# Patient Record
Sex: FEMALE | Race: AMERICAN INDIAN OR ALASKA NATIVE | NOT HISPANIC OR LATINO
[De-identification: names, ages, dates, MRNs, and addresses within clinical notes are randomized per-mention and may not be internally consistent; named-entity substitution may affect disease eponyms.]

---

## 2021-01-25 ENCOUNTER — TRANSCRIPTION ENCOUNTER (OUTPATIENT)
Age: 61
End: 2021-01-25

## 2022-08-17 PROBLEM — Z00.00 ENCOUNTER FOR PREVENTIVE HEALTH EXAMINATION: Status: ACTIVE | Noted: 2022-08-17

## 2022-08-25 ENCOUNTER — NON-APPOINTMENT (OUTPATIENT)
Age: 62
End: 2022-08-25

## 2022-08-29 ENCOUNTER — NON-APPOINTMENT (OUTPATIENT)
Age: 62
End: 2022-08-29

## 2022-08-29 ENCOUNTER — APPOINTMENT (OUTPATIENT)
Dept: NEUROSURGERY | Facility: CLINIC | Age: 62
End: 2022-08-29

## 2022-08-29 VITALS
SYSTOLIC BLOOD PRESSURE: 116 MMHG | BODY MASS INDEX: 14.11 KG/M2 | DIASTOLIC BLOOD PRESSURE: 78 MMHG | OXYGEN SATURATION: 97 % | HEIGHT: 59 IN | TEMPERATURE: 98 F | HEART RATE: 81 BPM | WEIGHT: 70 LBS

## 2022-08-29 PROCEDURE — 99205 OFFICE O/P NEW HI 60 MIN: CPT

## 2022-08-29 NOTE — END OF VISIT
[FreeTextEntry3] : I have seen the patient and reviewed the case together with PA and I agree with the final recommendations and plan of care.\par \par Phillip Strong MD\par Neurosurgery\par \par  [Time Spent: ___ minutes] : I have spent [unfilled] minutes of time on the encounter. [>50% of the face to face encounter time was spent on counseling and/or coordination of care for ___] : Greater than 50% of the face to face encounter time was spent on counseling and/or coordination of care for [unfilled]

## 2022-08-29 NOTE — HISTORY OF PRESENT ILLNESS
[de-identified] : JOSE CEBALLOS is a 61 year old right handed female with a PMH of hypothyroidism, left side cervical myelitis C3 age 21 with left side weakness at baseline, C4-7 ACDF for cervical myelopathy at [facility] by Dr. Fuentes complicated by B/L PE November 2019 who presents to the office today self-referred for neurosurgical consultation due to neck and low back pain. She had significant improvement in neck pain and ambulation after Nov 2019 C4-7 ACDF. However, in March 2020 she reached into a deep washing machine and severe pain returned which has remained since then. She has chronic neck and low back pain. The pain is characterized as burning in nature.  It started over 2 years ago ago. It is exacerbated by sitting, standing, walking long distances  and relieved by rest.  It radiates right arm and right leg down to the foot. She has Right foot numbness. There has been no known trauma precipitating the pain. The patient denies new numbness of extremities, new weakness of extremities, bowel or bladder incontinence and new gait disturbance. She has tried JUAN, PT, and pain medications including Percocet, gabapetin, tylenol, advil without relief.  She has undergone imaging in the form of MRI lumbar spine at [facility] on 6/6/22 which revealed mild to moderate disc buldges throughout the lumbar spine without focal disc herniation (see detailed report scanned in EMR), and brought in a CD for review. Most recent cervical spine imaging done March 2020 which she does not have with her. \par \par PMH: per HPI\par PSH:C4-7 ACDF Nov 2019\par FamHx:mother seizures\par Social Hx: not currently working,  lives with  and daughter is her caregiver, non -smoker, denies Etoh\par Allergies: NKDA\par Medications: Percocet QHS, levothyroxine 75mcg\par  \par \par Pain management: Dr. Carr\par Neurologist: Dr. Barakat\par PCP: Dr. Robin Michaud

## 2022-08-29 NOTE — PHYSICAL EXAM
[General Appearance - Alert] : alert [General Appearance - In No Acute Distress] : in no acute distress [Oriented To Time, Place, And Person] : oriented to person, place, and time [Impaired Insight] : insight and judgment were intact [Affect] : the affect was normal [Person] : oriented to person [Place] : oriented to place [Time] : oriented to time [Short Term Intact] : short term memory intact [Remote Intact] : remote memory intact [Span Intact] : the attention span was normal [Concentration Intact] : normal concentrating ability [Fluency] : fluency intact [Comprehension] : comprehension intact [Current Events] : adequate knowledge of current events [Past History] : adequate knowledge of personal past history [Vocabulary] : adequate range of vocabulary [Cranial Nerves Optic (II)] : visual acuity intact bilaterally,  pupils equal round and reactive to light [Cranial Nerves Oculomotor (III)] : extraocular motion intact [Cranial Nerves Trigeminal (V)] : facial sensation intact symmetrically [Cranial Nerves Facial (VII)] : face symmetrical [Cranial Nerves Vestibulocochlear (VIII)] : hearing was intact bilaterally [Cranial Nerves Glossopharyngeal (IX)] : tongue and palate midline [Cranial Nerves Accessory (XI - Cranial And Spinal)] : head turning and shoulder shrug symmetric [Cranial Nerves Hypoglossal (XII)] : there was no tongue deviation with protrusion [Sensation Tactile Decrease] : light touch was intact [2+] : Triceps left 2+ [Hemiplegia Of Left Side] : hemiplegia is present on the left [3+] : Patella left 3+ [Past-pointing] : there was no past-pointing [Tremor] : no tremor present [FreeTextEntry6] : right 5/5 throughout [FreeTextEntry8] : unsteady gait with cane

## 2022-08-29 NOTE — ASSESSMENT
[FreeTextEntry1] : I have discussed the natural history and treatment options for chronic lumbar and cervical radiculopathy with the patient. I explained the indications for observation, conservative management, medical management, physical therapy, pain management approaches and surgery. I explained the different types and surgical approaches including decompression only procedures and instrumented fusions. I discussed the risks, benefits, possible complications and expected outcome related to each treatment option. The risks of surgery were discussed in detail including but not limited to postoperative infection at the surgical site, hospital acquired pneumonia, hospital acquired urinary tract infection, postoperative meningitis, wound dehiscence, CSF leak, stroke (ischemic and hemorrhagic), postoperative seizures, worsening motor function due to spinal cord or nerve injury, postoperative visual deficit which could be permanent (blindness) when surgery is performed in a prone position, cardiovascular complications (MI, PE, DVT) and I also explained that some of these complications could lead to sepsis, coma or even death. I discussed the fact that some of these complications may require subsequent surgical procedure(s) to correct them. \par I have reviewed her recent lumbar spine MRI which does not reveal significant disc disease or spondylosis. For her symptoms of cervical radiculopathy, I recommend MRI of cervical spine without contrast to evaluate for cervical spinal stenosis as well has CT cervical spine without contrast and X-rays of cervical spine with flexion and extension to evaluate state of her surgical hardware. She should follow up with us in 2-4 weeks when imaging is complete. The patient understands the plan of care and is in agreement.  All questions answered to patient satisfaction.

## 2022-10-03 ENCOUNTER — RESULT REVIEW (OUTPATIENT)
Age: 62
End: 2022-10-03

## 2022-10-12 ENCOUNTER — APPOINTMENT (OUTPATIENT)
Dept: NEUROSURGERY | Facility: CLINIC | Age: 62
End: 2022-10-12

## 2022-10-12 VITALS
HEART RATE: 107 BPM | HEIGHT: 59 IN | TEMPERATURE: 98.7 F | SYSTOLIC BLOOD PRESSURE: 111 MMHG | DIASTOLIC BLOOD PRESSURE: 39 MMHG | WEIGHT: 70 LBS | OXYGEN SATURATION: 98 % | BODY MASS INDEX: 14.11 KG/M2

## 2022-10-12 PROCEDURE — 99215 OFFICE O/P EST HI 40 MIN: CPT

## 2022-10-13 ENCOUNTER — TRANSCRIPTION ENCOUNTER (OUTPATIENT)
Age: 62
End: 2022-10-13

## 2022-10-13 NOTE — ASSESSMENT
[FreeTextEntry1] : I have discussed the natural history and treatment options for chronic lumbar and cervical radiculopathy with the patient. I explained the indications for observation, conservative management, medical management, physical therapy, pain management approaches and surgery. I explained the different types and surgical approaches including decompression only procedures and instrumented fusions. I discussed the risks, benefits, possible complications and expected outcome related to each treatment option. The risks of surgery were discussed in detail including but not limited to postoperative infection at the surgical site, hospital acquired pneumonia, hospital acquired urinary tract infection, postoperative meningitis, wound dehiscence, CSF leak, stroke (ischemic and hemorrhagic), postoperative seizures, worsening motor function due to spinal cord or nerve injury, postoperative visual deficit which could be permanent (blindness) when surgery is performed in a prone position, cardiovascular complications (MI, PE, DVT) and I also explained that some of these complications could lead to sepsis, coma or even death. I discussed the fact that some of these complications may require subsequent surgical procedure(s) to correct them. \par In the end, I recommend conservative management with physical therapy and pain management. Referrals were provided to the patient. The patient understands the plan of care and is in agreement. All questions answered to patient satisfaction.

## 2022-10-13 NOTE — HISTORY OF PRESENT ILLNESS
[FreeTextEntry1] : Ms. Pacheco returns today for interval follow up and imaging review. She has undergone MRI, CT and flexex X-ray of cervical spine as requested, see detailed reports below. No interval changes to presenting symptoms or medical history\par \par 8/29/22: JOSE PACHECO is a 61 year old right handed female with a PMH of hypothyroidism, left side cervical myelitis C3 age 21 with left side weakness at baseline, C4-7 ACDF for cervical myelopathy at [facility] by Dr. Fuentes complicated by B/L PE November 2019 who presents to the office today self-referred for neurosurgical consultation due to neck and low back pain. She had significant improvement in neck pain and ambulation after Nov 2019 C4-7 ACDF. However, in March 2020 she reached into a deep washing machine and severe pain returned which has remained since then. She has chronic neck and low back pain. The pain is characterized as burning in nature. It started over 2 years ago ago. It is exacerbated by sitting, standing, walking long distances and relieved by rest. It radiates right arm and right leg down to the foot. She has Right foot numbness. There has been no known trauma precipitating the pain. The patient denies new numbness of extremities, new weakness of extremities, bowel or bladder incontinence and new gait disturbance. She has tried JUAN, PT, and pain medications including Percocet, gabapetin, tylenol, advil without relief. She has undergone imaging in the form of MRI lumbar spine at [facility] on 6/6/22 which revealed mild to moderate disc buldges throughout the lumbar spine without focal disc herniation (see detailed report scanned in EMR), and brought in a CD for review. Most recent cervical spine imaging done March 2020 which she does not have with her. \par \par PMH: per HPI\par PSH:C4-7 ACDF Nov 2019\par FamHx:mother seizures\par Social Hx: not currently working,  lives with  and daughter is her caregiver, non -smoker, denies Etoh\par Allergies: NKDA\par Medications: Percocet QHS, levothyroxine 75mcg\par  \par \par Pain management: Dr. Carr\par Neurologist: Dr. Barakat\par PCP: Dr. Robin Mihcaud \par

## 2022-10-13 NOTE — DATA REVIEWED
[de-identified] : Exam Date: 	  10/03/22					\par Exam: 	CT CERVICAL SPINE					\par Order#:	[id number]					\par             \par \par \par Chronic cervical radiculopathy.\par \par \par TECHNIQUE: CT of the cervical spine was performed utilizing helically obtained axial images from the occiput through T1. Images were reformatted into coronal and sagittal orientation.\par \par Findings: The patient is status post anterior cervical discectomy and fusion of C4, C5, C6 and C7. There is a ventral plate and anterior screws present. There has been interbody fusion at the C4/C5 C5/C6 and C6/C7 disc spaces.\par \par There is mild reversal of the cervical spine. There is minimal to mild retrolisthesis of C3 on C4 (approximately 2-3 mm). There is minimal anterolisthesis of C7 on T1 (approximately 1 to 2 mm). The vertebral bodies are of normal height. The remaining intervertebral disc spaces demonstrate mild loss of height consistent with desiccation. There is no evidence of prevertebral soft tissue swelling.\par \par Evaluation of the individual levels demonstrates no evidence of focal disc herniation or spinal cord compression.\par \par At the C2/3 level, there is tiny central disc herniation indenting the thecal sac. The bilateral neuroforamen are patent. There is no evidence of spinal cord compression.\par \par At the C3/4 level, there is a disc osteophyte complex mildly flattening the ventral spinal cord. There is mild uncovertebral and facet hypertrophy. The bilateral neuroforamen are patent.\par \par At the C4/5 level, the patient is status post fusion. There is a disc osteophyte complex that appears to be flattening the ventral spinal cord. There is bilateral uncovertebral and facet hypertrophy. There is moderate to severe right and moderate left foraminal narrowing.\par \par At the C5/6 level, the patient is status post fusion. There is a disc osteophyte complex flattening the ventral spinal cord. There is bilateral uncovertebral and facet hypertrophy. There is mild to moderate bilateral foraminal narrowing.\par \par At the C6/7 level, the patient is status post fusion. There is a disc osteophyte complex flattening the ventral spinal cord. There is bilateral uncovertebral and facet hypertrophy. There is moderate bilateral foraminal narrowing.\par \par At the C7/T1 level, there is minimum disc bulge contacting the ventral thecal sac. There is no central spinal canal stenosis or neural foramen narrowing.\par \par \par There is no evidence of acute fractures.\par \par There are fibronodular changes seen at the apices of the lungs.\par \par \par Impression:\par \par Status post ACDF of C4 , C5, C6 and C7 and interbody fusion.\par \par Degenerative changes of the cervical spine with no evidence of acute fractures.\par \par Mild reversal of the cervical spine.\par \par Minimal to mild retrolisthesis of C3 on C4.\par \par Minimal anterolisthesis of C7 on T1 \par \par --- End of Report ---\par \par ***Electronically Signed ***\par -----------------------------------------------\par Maria A Goldberg MD              10/07/22 1708\par \par Dictated on 10/07/22\par \par \par Report cc:  Lindsey Cr NP; [de-identified] : Exam Date: 	  10/03/22					\par Exam: 	MRI CERVICAL SPINE					\par Order#:	[id number]					\par             \par \par \par PROCEDURE: MRI cervical spine without contrast\par \par INDICATION: Chronic cervical radiculopathy; status post C4-C7 ACDF\par \par TECHNIQUE: Sagittal T1, T2, STIR and axial T1, T2 and MEDIC images of the cervical spine were obtained.\par \par COMPARISON: MRI cervical spine 10/6/2018 and CT cervical spine 10/3/2022\par \par CORRELATION: Cervical x-rays 10/3/2022\par \par FINDINGS: The MRI examination demonstrates the patient to be status post interval anterior cervical plating with screw fixation at the C4 through the C7 levels. Interbody spacers are present at C4/5, C5/6 and C6/7. Metallic artifact from the patient's orthopedic hardware obscures fine detail. The orthopedic hardware is better evaluated on the patient's CT and cervical x-rays.\par \par There is loss of the normal cervical lordosis which may be secondary to positioning. The visualized vertebral body heights and intervertebral disc spaces are maintained. The visualized vertebral body marrow signal is appropriate for the patient's stated age. The previously seen signal abnormality within the left side of the spinal cord at the C3 level is not well seen on the current study. The prevertebral soft tissues are within normal limits. The cerebellar tonsils are normal in position.\par \par At the C2/3 level, there is small central disc herniation indenting the thecal sac. There is no central spinal canal stenosis or neural foramen narrowing.\par \par At the C3/4 level, there is disc bulge indenting the thecal sac. There is mild central spinal canal stenosis. There is no neural foramen narrowing.\par \par At the C4/5 level, the patient is status post fusion.\par \par At the C5/6 level, the patient is status post fusion.\par \par At the C6/7 level, the patient is status post fusion.\par \par At the C7/T1 level, there is minimum disc bulge. There is no central spinal canal stenosis or neural foramen narrowing.\par \par \par IMPRESSION: Patient status post fusion C4-C7. Small central disc herniation at C2/3 and disc bulges at C3/4 and C7/T1. Mild central spinal canal stenosis at C3/4.\par \par --- End of Report ---\par \par ***Electronically Signed ***\par -----------------------------------------------\par Cynthia Paula MD              10/07/22 1130\par \par Dictated on 10/07/22\par \par \par Report cc:  Lindsey Cr NP; [de-identified] : Exam Date: 	  10/03/22					\par Exam: 	XR C SPINE 5 VIEWS (COMMON)					\par Order#:	[id number]					\par             \par \par \par Clinical indication: Cervical fusion\par \par Technique: 5 views of the cervical spine\par \par Comparison: None available\par \par Findings:\par \par Patient is status post anterior cervical discectomy and fusion from C4 to C7 using an anterior plate secured by bilateral vertebral body screws. There is no hardware complication.\par \par There is no significant loss vertebral body height. There is no significant listhesis or scoliosis. There is no dynamic instability with flexion/extension. There is multilevel discogenic degenerative disease of the cervical spine as manifested by disc space narrowing and endplate osteophytosis, most pronounced at C3-C4. The prevertebral soft tissues are within normal limits.\par \par \par Impression:\par \par Anterior cervical discectomy and fusion from C4 to C7 without hardware complication.\par \par Discogenic degenerative disease, most pronounced at C3-C4.\par \par --- End of Report ---\par \par ***Electronically Signed ***\par -----------------------------------------------\par JOSE SCOTT MD              10/04/22 1046\par \par Dictated on 10/04/22\par \par \par Report cc:  Lindsey Cr NP;

## 2022-10-13 NOTE — PHYSICAL EXAM
[FreeTextEntry1] : Constitutional: alert and in no acute distress. \par Psychiatric: oriented to person, place, and time, insight and judgment were intact and the affect was normal. \par \par Orientation: oriented to person, oriented to place and oriented to time. \par Memory: short term memory intact and remote memory intact. \par Attention: the attention span was normal and normal concentrating ability. \par Language: fluency intact and comprehension intact. \par Fund of knowledge: displays adequate knowledge of current events, adequate knowledge of personal past history and adequate range of vocabulary. \par Cranial Nerves: visual acuity intact bilaterally, pupils equal round and reactive to light, extraocular motion intact, facial sensation intact symmetrically, face symmetrical, hearing was intact bilaterally, tongue and palate midline, head turning and shoulder shrug symmetric and there was no tongue deviation with protrusion. \par Motor Strength: right 5/5 throughout . hemiplegia is present on the left. \par Sensory exam: light touch was intact. \par Coordination:. there was no past-pointing. no tremor present. unsteady gait with cane. \par Deep tendon reflexes: \par Biceps right 2+. Biceps left 2+.  \par Triceps right 2+. Triceps left 2+.  \par Patella right 3+. Patella left 3+.  \par

## 2022-11-01 ENCOUNTER — APPOINTMENT (OUTPATIENT)
Dept: PAIN MANAGEMENT | Facility: CLINIC | Age: 62
End: 2022-11-01

## 2022-11-01 VITALS
BODY MASS INDEX: 14.11 KG/M2 | DIASTOLIC BLOOD PRESSURE: 66 MMHG | TEMPERATURE: 98 F | SYSTOLIC BLOOD PRESSURE: 103 MMHG | HEIGHT: 59 IN | WEIGHT: 70 LBS

## 2022-11-01 PROCEDURE — 99204 OFFICE O/P NEW MOD 45 MIN: CPT

## 2022-11-01 RX ORDER — LEVOTHYROXINE SODIUM 0.07 MG/1
75 TABLET ORAL
Qty: 30 | Refills: 0 | Status: ACTIVE | COMMUNITY
Start: 2022-10-11

## 2022-11-01 RX ORDER — PREDNISOLONE ACETATE 10 MG/ML
1 SUSPENSION/ DROPS OPHTHALMIC
Qty: 5 | Refills: 0 | Status: ACTIVE | COMMUNITY
Start: 2022-08-12

## 2022-11-01 RX ORDER — GABAPENTIN 100 MG/1
100 CAPSULE ORAL
Refills: 0 | Status: ACTIVE | COMMUNITY

## 2022-11-01 NOTE — REVIEW OF SYSTEMS
[Back Pain] : back pain [Neck Pain] : neck pain [Muscle Pain] : muscle pain [Radiating Pain] : radiating pain [Joint Pain] : joint pain [Numbness] : numbness [Negative] : Heme/Lymph

## 2022-11-01 NOTE — PHYSICAL EXAM
[de-identified] : Constitutional: Well-developed, in no acute distress\par \par Musculoskeletal:\par Cervical Spine:   \par Gait: Antalgic\par Inspection: Normal curvature, no abnormal kyphosis or scoliosis\par \par Psychiatric: Appropriate mood and affect, oriented to time, place, person, and situation\par \par \par

## 2022-11-01 NOTE — CONSULT LETTER
[Dear  ___] : Dear  [unfilled], [Consult Letter:] : I had the pleasure of evaluating your patient, [unfilled]. [Please see my note below.] : Please see my note below. [Consult Closing:] : Thank you very much for allowing me to participate in the care of this patient.  If you have any questions, please do not hesitate to contact me. [Sincerely,] : Sincerely, [FreeTextEntry3] : Marc Austin MD\par

## 2022-11-01 NOTE — ASSESSMENT
[FreeTextEntry1] : 62 yof s/p C4-C7 ACDF and history of cervical myelitis presents w/ severe neck and right arm pain. There is also right leg pain. She has seen multiple neurosurgeons who recommended against surgery.\par \par I have personally reviewed the patient's MRI in detail and discussed it with them which is significant for small disc bulging in the cervical spine.\par \par The patient has failed to have relief with medication management and physical therapy. Given the patients failure to improve with all other conservative measures, recommend C7-T1 interlaminar epidural steroid injection under fluoroscopic guidance. The patient will follow-up with me in my office two weeks following intervention.\par \par I have discussed in detail with the patient that an interventional spine procedure is associated with potential risks. The procedure may include an injection of steroid and potentially other medications (local anesthetic and normal saline) into the epidural space or surrounding tissue of the spine. There are significant risks of this procedure which include and are not limited to infection, bleeding, worsening pain, dural puncture leading to post-dural puncture headache, nerve damage, spinal cord injury, paralysis, stroke, and death. There is a chance that the procedure does not improve their pain. There are risks associated with the steroid being absorbed into the body systemically. These include dysphoria, difficulty sleeping, mood swings, and personality changes. Pre-menopausal women may notice a regularity his in her menstrual cycle for 2-3 months following the injection. Steroids can specifically affect patients with hypertension, diabetes, and peptic ulcers. The procedure may cause a temporary increase in blood pressure and blood glucose, and may adversely affect a peptic ulcer. Other, more rare complications, including avascular necrosis of the joints, glaucoma, and osteoporosis. I have discussed the risks of the procedure at length with the patient, and the potential benefits of pain relief. I have offered alternatives to the procedure. All questions were answered. The patient expressed understanding and wishes to proceed with the procedure.\par \par Physical therapy prescribed - goal will be to increase ROM, strengthening, postural training, other modalities ad mina which may include massage and stim. Goals of therapy discussed with the patient in detail and will be discussed with physical therapist. Patient will follow-up following course of physical therapy to monitor progress and adjust therapy as needed.\par \par Acetaminophen 1,000 mg q8h prn for moderate pain. Risks, benefits, and alternatives of acetaminophen discussed with patient.\par \par Ibuprofen 600 mg q8h prn add when pain is not adequately controlled with acetaminophen. Risks, benefits, and alternatives of ibuprofen discussed with patient.\par \par Diet and nutritional strategies discussed which may improve patients pain and will improve overall health.\par

## 2022-11-01 NOTE — DATA REVIEWED
[FreeTextEntry1] : 10/03/22\par MRI CERVICAL SPINE\par \par \par  FINDINGS: The MRI examination demonstrates the patient to be status post interval anterior cervical\par plating with screw fixation at the C4 through the C7 levels. Interbody spacers are present at C4/5,\par C5/6 and C6/7. Metallic artifact from the patient's orthopedic hardware obscures fine detail. The\par orthopedic hardware is better evaluated on the patient's CT and cervical x-rays.\par \par  There is loss of the normal cervical lordosis which may be secondary to positioning. The visualized\par vertebral body heights and intervertebral disc spaces are maintained. The visualized vertebral body\par marrow signal is appropriate for the patient's stated age. The previously seen signal abnormality\par within the left side of the spinal cord at the C3 level is not well seen on the current study. The\par prevertebral soft tissues are within normal limits. The cerebellar tonsils are normal in position.\par \par  At the C2/3 level, there is small central disc herniation indenting the thecal sac. There is no\par central spinal canal stenosis or neural foramen narrowing.\par \par  At the C3/4 level, there is disc bulge indenting the thecal sac. There is mild central spinal canal\par stenosis. There is no neural foramen narrowing.\par \par  At the C4/5 level, the patient is status post fusion.\par \par  At the C5/6 level, the patient is status post fusion.\par \par  At the C6/7 level, the patient is status post fusion.\par \par  At the C7/T1 level, there is minimum disc bulge. There is no central spinal canal stenosis or\par neural foramen narrowing.\par \par \par  IMPRESSION: Patient status post fusion C4-C7. Small central disc herniation at C2/3 and disc bulges\par at C3/4 and C7/T1. Mild central spinal canal stenosis at C3/4.\par \par

## 2022-11-01 NOTE — HISTORY OF PRESENT ILLNESS
[3] : 3. What number best describes how, during the past week, pain has interfered with your general activity? 3/10 pain [Neck Pain] : neck pain [___ yrs] : [unfilled] year(s) ago [Constant] : constant [8] : a minimum pain level of 8/10 [10] : a maximum pain level of 10/10 [Burning] : burning [Shooting] : shooting [Electric] : electric [Laying] : laying [Sitting] : sitting [Standing] : standing [Walking] : walking [Bending] : bending [Lifting] : lifting [Medications] : medications [FreeTextEntry1] : HPI: Ms. JOES CEBALLOS is a 62 year F with pmhx of cervical myelitis since age 21 (on po Prednisone prn neurologist- Dr Barakat at [facility]), hx post op PE - off AC (Dr Holt), s/p C4-7 Cervical fusion 2019 (Dr Fuentes). Complaints of right neck pain and burning that radiates down the arm. Worst throughout the course of the day. Also complaints of lower back pain that radiates down entire leg to bottom of foot. She has seen Dr. Fuentes and Dr. Strong who recommended against surgery. She had some relief from previous JUAN but short-lived. \par \par \par \par Previous and current pain medications/doses/effects: Gabapentin 100mg - 200mg, Lidocaine patch, Voltaren, methocarbamol\par \par  \par \par Previous Pain Treatments: \par \par  \par \par Previous Pain Injections: Cervical JUAN- [facility] (June 2022)\par \par  \par \par Previous Diagnostic Studies/Images:\par \par  \par \par \par  [FreeTextEntry2] : 9 [FreeTextEntry7] : Right side pain , neck and radiates down right leg .  [FreeTextEntry4] : PT , Epidural injection, surgery

## 2022-11-01 NOTE — REASON FOR VISIT
[Initial Consultation] : an initial pain management consultation [FreeTextEntry2] : Referred by Dr. Strong

## 2022-11-16 ENCOUNTER — TRANSCRIPTION ENCOUNTER (OUTPATIENT)
Age: 62
End: 2022-11-16

## 2022-11-16 ENCOUNTER — APPOINTMENT (OUTPATIENT)
Dept: PAIN MANAGEMENT | Facility: HOSPITAL | Age: 62
End: 2022-11-16

## 2022-11-16 ENCOUNTER — RESULT REVIEW (OUTPATIENT)
Age: 62
End: 2022-11-16

## 2022-12-06 ENCOUNTER — APPOINTMENT (OUTPATIENT)
Dept: PAIN MANAGEMENT | Facility: CLINIC | Age: 62
End: 2022-12-06

## 2022-12-06 VITALS
SYSTOLIC BLOOD PRESSURE: 117 MMHG | DIASTOLIC BLOOD PRESSURE: 77 MMHG | TEMPERATURE: 98 F | HEIGHT: 59 IN | WEIGHT: 70 LBS | BODY MASS INDEX: 14.11 KG/M2

## 2022-12-06 PROCEDURE — 99214 OFFICE O/P EST MOD 30 MIN: CPT

## 2022-12-06 NOTE — ASSESSMENT
[FreeTextEntry1] : 62 yof s/p C4-C7 ACDF and history of cervical myelitis presents w/ severe neck and right arm pain. There is also right leg pain. She has seen multiple neurosurgeons who recommended against surgery. She had short term relief from JUAN.\par \par I have personally reviewed the patient's MRI in detail and discussed it with them which is significant for small disc bulging in the cervical spine.\par \par The patient has failed to have relief with medication management and physical therapy. Given the patients failure to improve with all other conservative measures, recommend C7-T1 interlaminar epidural steroid injection under fluoroscopic guidance. The patient will follow-up with me in my office two weeks following intervention. If no relief, i do believe that she is a SCS candidate.\par \par I have discussed in detail with the patient that an interventional spine procedure is associated with potential risks. The procedure may include an injection of steroid and potentially other medications (local anesthetic and normal saline) into the epidural space or surrounding tissue of the spine. There are significant risks of this procedure which include and are not limited to infection, bleeding, worsening pain, dural puncture leading to post-dural puncture headache, nerve damage, spinal cord injury, paralysis, stroke, and death. There is a chance that the procedure does not improve their pain. There are risks associated with the steroid being absorbed into the body systemically. These include dysphoria, difficulty sleeping, mood swings, and personality changes. Pre-menopausal women may notice a regularity his in her menstrual cycle for 2-3 months following the injection. Steroids can specifically affect patients with hypertension, diabetes, and peptic ulcers. The procedure may cause a temporary increase in blood pressure and blood glucose, and may adversely affect a peptic ulcer. Other, more rare complications, including avascular necrosis of the joints, glaucoma, and osteoporosis. I have discussed the risks of the procedure at length with the patient, and the potential benefits of pain relief. I have offered alternatives to the procedure. All questions were answered. The patient expressed understanding and wishes to proceed with the procedure.\par \par Physical therapy prescribed - goal will be to increase ROM, strengthening, postural training, other modalities ad mina which may include massage and stim. Goals of therapy discussed with the patient in detail and will be discussed with physical therapist. Patient will follow-up following course of physical therapy to monitor progress and adjust therapy as needed.\par \par Acetaminophen 1,000 mg q8h prn for moderate pain. Risks, benefits, and alternatives of acetaminophen discussed with patient.\par \par Ibuprofen 600 mg q8h prn add when pain is not adequately controlled with acetaminophen. Risks, benefits, and alternatives of ibuprofen discussed with patient.\par \par Brittany Zambrano NP to see patient to evaluate for medical marijuana. \par

## 2022-12-06 NOTE — HISTORY OF PRESENT ILLNESS
[3] : 3. What number best describes how, during the past week, pain has interfered with your general activity? 3/10 pain [Neck Pain] : neck pain [___ yrs] : [unfilled] year(s) ago [Constant] : constant [8] : a minimum pain level of 8/10 [10] : a maximum pain level of 10/10 [Burning] : burning [Shooting] : shooting [Electric] : electric [Laying] : laying [Sitting] : sitting [Standing] : standing [Walking] : walking [Bending] : bending [Lifting] : lifting [Medications] : medications [FreeTextEntry1] : Interval History:\par Patient returns for follow-up today. She reports one week of almost complete relief of her right arm and leg pain for one week after her JUAN. Back pain improved as well. Left side remains normal. Quality of life is impaired. There has been a severe exacerbation of the patient's chronic pain.\par \par \par HPI: Ms. JOSE CEBALLOS is a 62 year F with pmhx of cervical myelitis since age 21 (on po Prednisone prn neurologist- Dr Barakat at [facility]), hx post op PE - off AC (Dr Holt), s/p C4-7 Cervical fusion 2019 (Dr Fuentes). Complaints of right neck pain and burning that radiates down the arm. Worst throughout the course of the day. Also complaints of lower back pain that radiates down entire leg to bottom of foot. She has seen Dr. Fuentes and Dr. Strong who recommended against surgery. She had some relief from previous JUAN but short-lived. \par \par Previous and current pain medications/doses/effects: Gabapentin 100mg - 200mg, Lidocaine patch, Voltaren, methocarbamol\par \par \par Interventions:\par C7-T1 interlaminar JUAN (11/16/22):\par Cervical JUAN- [facility] [place] (June 2022)\par  [FreeTextEntry2] : 9 [FreeTextEntry7] : Right side pain , neck and radiates down right leg .  [FreeTextEntry4] : PT , Epidural injection, surgery

## 2022-12-06 NOTE — PHYSICAL EXAM
[de-identified] : Constitutional: Well-developed, in no acute distress\par \par Musculoskeletal:\par Cervical Spine:   \par Gait: Antalgic\par Inspection: Normal curvature, no abnormal kyphosis or scoliosis\par \par Psychiatric: Appropriate mood and affect, oriented to time, place, person, and situation\par \par \par

## 2022-12-13 ENCOUNTER — NON-APPOINTMENT (OUTPATIENT)
Age: 62
End: 2022-12-13

## 2022-12-13 ENCOUNTER — APPOINTMENT (OUTPATIENT)
Dept: PAIN MANAGEMENT | Facility: CLINIC | Age: 62
End: 2022-12-13

## 2022-12-20 ENCOUNTER — APPOINTMENT (OUTPATIENT)
Dept: PAIN MANAGEMENT | Facility: HOSPITAL | Age: 62
End: 2022-12-20

## 2023-05-15 ENCOUNTER — APPOINTMENT (OUTPATIENT)
Dept: PAIN MANAGEMENT | Facility: CLINIC | Age: 63
End: 2023-05-15
Payer: MEDICAID

## 2023-05-15 VITALS — SYSTOLIC BLOOD PRESSURE: 130 MMHG | HEART RATE: 64 BPM | DIASTOLIC BLOOD PRESSURE: 90 MMHG | OXYGEN SATURATION: 100 %

## 2023-05-15 DIAGNOSIS — G89.4 CHRONIC PAIN SYNDROME: ICD-10-CM

## 2023-05-15 DIAGNOSIS — M54.12 RADICULOPATHY, CERVICAL REGION: ICD-10-CM

## 2023-05-15 PROCEDURE — 99214 OFFICE O/P EST MOD 30 MIN: CPT

## 2023-05-15 NOTE — ASSESSMENT
[FreeTextEntry1] : 62 yof s/p C4-C7 ACDF and history of cervical myelitis presents w/ severe neck and right arm pain. There is also right leg pain. She has seen multiple neurosurgeons who recommended against surgery. She had short term relief from JUAN.\par \par I have personally reviewed the patient's MRI in detail and discussed it with them which is significant for small disc bulging in the cervical spine.\par \par Refer for surgical consultation.\par \par Physical therapy prescribed - goal will be to increase ROM, strengthening, postural training, other modalities ad mina which may include massage and stim. Goals of therapy discussed with the patient in detail and will be discussed with physical therapist. Patient will follow-up following course of physical therapy to monitor progress and adjust therapy as needed.\par \par Acetaminophen 1,000 mg q8h prn for moderate pain. Risks, benefits, and alternatives of acetaminophen discussed with patient.\par \par Ibuprofen 600 mg q8h prn add when pain is not adequately controlled with acetaminophen. Risks, benefits, and alternatives of ibuprofen discussed with patient.\par \par Consider SCS trial if all other measures fail.\par

## 2023-05-15 NOTE — PHYSICAL EXAM
[de-identified] : Constitutional: Well-developed, in no acute distress\par \par Musculoskeletal:\par Cervical Spine:   \par Gait: Antalgic\par Inspection: Normal curvature, no abnormal kyphosis or scoliosis\par \par Psychiatric: Appropriate mood and affect, oriented to time, place, person, and situation\par \par \par

## 2023-05-15 NOTE — HISTORY OF PRESENT ILLNESS
[Neck Pain] : neck pain [___ yrs] : [unfilled] year(s) ago [Constant] : constant [8] : a minimum pain level of 8/10 [10] : a maximum pain level of 10/10 [Burning] : burning [Shooting] : shooting [Electric] : electric [Laying] : laying [Sitting] : sitting [Standing] : standing [Walking] : walking [Bending] : bending [Lifting] : lifting [Medications] : medications [3] : 3. What number best describes how, during the past week, pain has interfered with your general activity? 3/10 pain [FreeTextEntry1] : Interval History:\par Patient returns for follow-up today. She reports one week of almost complete relief of her right arm and leg pain for one week after her JUAN. Back pain improved as well. Left side remains normal. Quality of life is impaired. There has been a severe exacerbation of the patient's chronic pain. Pain is severe and worsening again. Wishes for surgical consultation.\par \par HPI: Ms. JOSE CEBALLOS is a 62 year F with pmhx of cervical myelitis since age 21 (on po Prednisone prn neurologist- Dr Barakat at [facility]), hx post op PE - off AC (Dr Holt), s/p C4-7 Cervical fusion 2019 (Dr Fuentes). Complaints of right neck pain and burning that radiates down the arm. Worst throughout the course of the day. Also complaints of lower back pain that radiates down entire leg to bottom of foot. She has seen Dr. Fuentes and Dr. Strong who recommended against surgery. She had some relief from previous JUAN but short-lived. \par \par Previous and current pain medications/doses/effects: Gabapentin 100mg - 200mg, Lidocaine patch, Voltaren, methocarbamol\par \par \par Interventions:\par C7-T1 interlaminar JUAN (11/16/22):\par Cervical JUAN- [facility] (June 2022)\par  [FreeTextEntry7] : Right side pain , neck and radiates down right leg .  [FreeTextEntry4] : PT , Epidural injection, surgery  [FreeTextEntry2] : 9

## 2023-06-20 ENCOUNTER — APPOINTMENT (OUTPATIENT)
Dept: PAIN MANAGEMENT | Facility: HOSPITAL | Age: 63
End: 2023-06-20